# Patient Record
Sex: FEMALE | Race: WHITE
[De-identification: names, ages, dates, MRNs, and addresses within clinical notes are randomized per-mention and may not be internally consistent; named-entity substitution may affect disease eponyms.]

---

## 2022-04-20 ENCOUNTER — HOSPITAL ENCOUNTER (OUTPATIENT)
Dept: HOSPITAL 75 - RT | Age: 67
End: 2022-04-20
Attending: INTERNAL MEDICINE
Payer: MEDICAID

## 2022-04-20 DIAGNOSIS — J43.9: Primary | ICD-10-CM

## 2022-04-20 PROCEDURE — 94729 DIFFUSING CAPACITY: CPT

## 2022-04-20 PROCEDURE — 94060 EVALUATION OF WHEEZING: CPT

## 2022-04-20 PROCEDURE — 94726 PLETHYSMOGRAPHY LUNG VOLUMES: CPT

## 2022-11-07 ENCOUNTER — HOSPITAL ENCOUNTER (OUTPATIENT)
Dept: HOSPITAL 75 - RAD | Age: 67
End: 2022-11-07
Attending: INTERNAL MEDICINE
Payer: MEDICAID

## 2022-11-07 DIAGNOSIS — M47.817: ICD-10-CM

## 2022-11-07 DIAGNOSIS — M47.816: Primary | ICD-10-CM

## 2022-11-07 DIAGNOSIS — Z85.3: ICD-10-CM

## 2022-11-07 DIAGNOSIS — M48.061: ICD-10-CM

## 2022-11-07 PROCEDURE — 72148 MRI LUMBAR SPINE W/O DYE: CPT

## 2022-11-07 NOTE — DIAGNOSTIC IMAGING REPORT
PROCEDURE: MRI lumbar spine without contrast, 11/07/2022.



TECHNIQUE: Multiplanar, multisequence MRI of the lumbar spine was

performed without contrast.



INDICATION: Involuntary movements. Low back pain history of

breast carcinoma



COMPARISONS: None



FINDINGS:



There is minimal grade 1 anterolisthesis at L4-L5 with remaining

alignment maintained. Vertebral body heights are preserved. No

suspicious osseous lesions appreciated. The tip of the conus

unremarkable in appearance and location.



L1-L2: There is disc desiccation. There is bilateral facet and

ligamentum flavum hypertrophy. There is no central stenosis. The

neural foramina patent.



L2-L3: There is disc desiccation. There is bilateral facet

hypertrophy. There is no central stenosis. The neural foramina

appear patent.



L3-L4: There is disc desiccation. There is minimal broad-based

bulging disc material with bilateral facet and ligamentum flavum

hypertrophy. There is no central stenosis. Neural foramina

patent.



L4-L5: There is disc desiccation with a broad-based bulging disc.

There is bilateral facet and ligamentum flavum hypertrophy. There

is no significant central stenosis. There is moderate bilateral

neural foraminal narrowing.



L5-S1: There is disc desiccation with a broad-based bulging disc.

There is minimal bilateral facet hypertrophy. There is no central

stenosis. The neural foramina patent.



The visualized intra-abdominal structures unremarkable.



IMPRESSION:

1. Degenerative findings predominantly at L4-L5 and L5-S1 without

central stenosis. Bilateral moderate neural foraminal narrowing

noted at L4-L5. 

2. Not mentioned in the body of the report and only seen on the

localizer images are hypointense linear foci within the femoral

heads bilaterally suspicious for AVN. Correlate clinically.

Dedicated imaging of the hips could provide better

characterization. 



Dictated by: 



  Dictated on workstation # [id number]